# Patient Record
Sex: MALE
[De-identification: names, ages, dates, MRNs, and addresses within clinical notes are randomized per-mention and may not be internally consistent; named-entity substitution may affect disease eponyms.]

---

## 2020-04-02 PROBLEM — Z00.00 ENCOUNTER FOR PREVENTIVE HEALTH EXAMINATION: Status: ACTIVE | Noted: 2020-04-02

## 2020-05-07 ENCOUNTER — APPOINTMENT (OUTPATIENT)
Dept: ORTHOPEDIC SURGERY | Facility: CLINIC | Age: 40
End: 2020-05-07
Payer: COMMERCIAL

## 2020-05-07 DIAGNOSIS — M79.89 OTHER SPECIFIED SOFT TISSUE DISORDERS: ICD-10-CM

## 2020-05-07 PROCEDURE — 99203 OFFICE O/P NEW LOW 30 MIN: CPT

## 2020-05-07 NOTE — HISTORY OF PRESENT ILLNESS
[FreeTextEntry1] : This is the first visity of 39years old male recentely complained about right shoulder pain sughgesting related to rotator cuff tendinopathy. 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 at this stage pain subsided.

## 2020-05-07 NOTE — PHYSICAL EXAM
[FreeTextEntry1] : Physical exam reveal a healthy looking patient in no apparent distress,. no skin erythema,a,. there is a small;l painless subcutaneous m,ass over the proximal arm suggests fibrolipomatopus mass. patient regained full motion of the shoulder, no gross nervomuscular deficit. At this stage patient was recommended to be followed and to be seen in six month.b